# Patient Record
Sex: FEMALE
[De-identification: names, ages, dates, MRNs, and addresses within clinical notes are randomized per-mention and may not be internally consistent; named-entity substitution may affect disease eponyms.]

---

## 2021-04-25 ENCOUNTER — NURSE TRIAGE (OUTPATIENT)
Dept: OTHER | Facility: CLINIC | Age: 31
End: 2021-04-25

## 2021-04-25 NOTE — TELEPHONE ENCOUNTER
Brief description of triage: Pt's  called today stating pt has been having highs and lows and feels like she might have \"some mental health stuff going on.\"  reports pt stated to him that her brain hurts, she can't turn her brain off and has good days, then days where she is crying no reason on the couch.  is trying to obtain psych/mental health resources for wife. Triage indicates for patient to No Triage-information only. Caller was told to take pt to ED if pt is actively having SI/HI thoughts or plans. Caller was taken to provider search on [facility] website and given list of providers to take pt too. Caller was also told to reach out to pt's PCP for eval or further recommendation. Caller agreeable with plan and denies any questions or concerns at this time. Care advice provided, patient verbalizes understanding; denies any other questions or concerns; instructed to call back for any new or worsening symptoms. This triage is a result of a call to [address] [place] [address]. Please do not respond to the triage nurse through this encounter. Any subsequent communication should be directly with the patient. Reason for Disposition   [1] Caller is not with the adult (patient) AND [2] probable NON-URGENT symptoms    Answer Assessment - Initial Assessment Questions  1. REASON FOR CALL or QUESTION: \"What is your reason for calling today? \" or \"How can I best help you? \" or \"What question do you have that I can help answer? \"       calling for pt, his wife, asking about mental health providers and facilities for pt.     Protocols used: INFORMATION ONLY CALL - NO TRIAGE-[facility]